# Patient Record
Sex: FEMALE | Race: BLACK OR AFRICAN AMERICAN | NOT HISPANIC OR LATINO | Employment: UNEMPLOYED | URBAN - METROPOLITAN AREA
[De-identification: names, ages, dates, MRNs, and addresses within clinical notes are randomized per-mention and may not be internally consistent; named-entity substitution may affect disease eponyms.]

---

## 2017-05-26 PROBLEM — Z3A.26 26 WEEKS GESTATION OF PREGNANCY: Status: ACTIVE | Noted: 2017-05-26

## 2017-06-19 DIAGNOSIS — O24.912 DIABETES MELLITUS AFFECTING PREGNANCY IN SECOND TRIMESTER: ICD-10-CM

## 2017-06-19 DIAGNOSIS — O09.522 ELDERLY MULTIGRAVIDA IN SECOND TRIMESTER: Primary | ICD-10-CM

## 2017-06-28 ENCOUNTER — OFFICE VISIT (OUTPATIENT)
Dept: MATERNAL FETAL MEDICINE | Facility: CLINIC | Age: 42
End: 2017-06-28
Attending: OBSTETRICS & GYNECOLOGY
Payer: MEDICAID

## 2017-06-28 VITALS
HEIGHT: 66 IN | BODY MASS INDEX: 38.65 KG/M2 | DIASTOLIC BLOOD PRESSURE: 74 MMHG | WEIGHT: 240.5 LBS | SYSTOLIC BLOOD PRESSURE: 122 MMHG

## 2017-06-28 DIAGNOSIS — O09.522 ELDERLY MULTIGRAVIDA IN SECOND TRIMESTER: ICD-10-CM

## 2017-06-28 DIAGNOSIS — O16.3 HYPERTENSION AFFECTING PREGNANCY IN THIRD TRIMESTER: ICD-10-CM

## 2017-06-28 DIAGNOSIS — O09.523 ELDERLY MULTIGRAVIDA IN THIRD TRIMESTER: ICD-10-CM

## 2017-06-28 DIAGNOSIS — O24.419 GDM, CLASS A2: ICD-10-CM

## 2017-06-28 DIAGNOSIS — O24.912 DIABETES MELLITUS AFFECTING PREGNANCY IN SECOND TRIMESTER: ICD-10-CM

## 2017-06-28 PROCEDURE — 99999 PR PBB SHADOW E&M-EST. PATIENT-LVL III: CPT | Mod: PBBFAC,,, | Performed by: OBSTETRICS & GYNECOLOGY

## 2017-06-28 PROCEDURE — 99202 OFFICE O/P NEW SF 15 MIN: CPT | Mod: S$PBB,TH,25, | Performed by: OBSTETRICS & GYNECOLOGY

## 2017-06-28 PROCEDURE — 76811 OB US DETAILED SNGL FETUS: CPT | Mod: 26,S$PBB,, | Performed by: OBSTETRICS & GYNECOLOGY

## 2017-06-28 PROCEDURE — 76811 OB US DETAILED SNGL FETUS: CPT | Mod: PBBFAC | Performed by: OBSTETRICS & GYNECOLOGY

## 2017-06-28 PROCEDURE — 99213 OFFICE O/P EST LOW 20 MIN: CPT | Mod: PBBFAC | Performed by: OBSTETRICS & GYNECOLOGY

## 2017-06-28 RX ORDER — BLOOD SUGAR DIAGNOSTIC
STRIP MISCELLANEOUS
Refills: 0 | Status: ON HOLD | COMMUNITY
Start: 2017-06-12 | End: 2017-08-16 | Stop reason: HOSPADM

## 2017-06-28 RX ORDER — LANCETS
EACH MISCELLANEOUS
Refills: 3 | Status: ON HOLD | COMMUNITY
Start: 2017-06-12 | End: 2017-08-16 | Stop reason: HOSPADM

## 2017-06-28 NOTE — PROGRESS NOTES
Consulted by Dr. Mejia for AMA, Chronic Hypertension and GDM    42  ROCHELLE 19; 31w3d      Prenatal record, chart and OB History reviewed  Maternal serum screening wnl  Pt interviewed and examined  Ultrasound performed  No interval problems   No leaking, bleeding or discharge  Good FM    OB HX   - Dragan; 8-6;  at term without probs     - Philquante; 7-10;  at term without probs      Chronic Hypertension   Dx  - was on Lisinopril until she lost weight    Placed on Aldomet 500 mg BID during this pregnancy    GDM    Recently started on Metformin 500 mg BID  Monitoring BS and has a log but did not bring it today  From her recall BS in fairly good control    Impression  =========  BP stable on Aldomet  GDM on Glucophage  Normal fetal growth,  Normal amniotic fluid volume.  Normal placental location without evidence of previa.    Recommendation  ==============  Continue to monitor BP and BS as you are doing and adjust meds as needed  Would start NST 2x per week at 32 weeks  Would plan delivery at 37-39 weeks  Re-consult if clinical condition changes.

## 2017-06-28 NOTE — LETTER
June 28, 2017      Tony Mejia MD  [address] [address]           [facility]  [address]  [facility] [address]  Phone: [phone]          Patient: Gissel Lal   MR Number: [id number]   YOB: 1975   Date of Visit: 6/28/2017       Dear Dr. Tony Mejia:    Thank you for referring Gissel Lal to me for evaluation. Attached you will find relevant portions of my assessment and plan of care.    If you have questions, please do not hesitate to call me. I look forward to following Gissel Lal along with you.    Sincerely,    Mac Mckinley III, MD    Enclosure  CC:  No Recipients    If you would like to receive this communication electronically, please contact [email or web address] or [phone] to request more information on [employer] access.    For providers and/or their staff who would like to refer a patient to [facility], please contact us through our one-stop-shop provider referral line, [facility], at [phone].    If you feel you have received this communication in error or would no longer like to receive these types of communications, please [email or web address] [email or web address]

## 2017-08-14 PROBLEM — O24.419 GESTATIONAL DIABETES MELLITUS: Status: ACTIVE | Noted: 2017-08-14

## 2017-08-17 PROBLEM — O24.419 GESTATIONAL DIABETES MELLITUS: Status: RESOLVED | Noted: 2017-08-14 | Resolved: 2017-08-17

## 2018-05-07 PROBLEM — Z3A.26 26 WEEKS GESTATION OF PREGNANCY: Status: RESOLVED | Noted: 2017-05-26 | Resolved: 2018-05-07
